# Patient Record
(demographics unavailable — no encounter records)

---

## 2025-05-01 NOTE — HISTORY OF PRESENT ILLNESS
[de-identified] : Patient is a 59 y/o F who presents a chief complaint of left breast calcs.  Diag L MMG/US 3/13/25 ([facility]) - Rec stereotactic biopsy of the indeterminate grouping of developing left breast calcifications (BIRADS 4)  Screening MMG/US 1/23/25 - additional imaging BIRADS 0.  Personal history of prior benign bilateral breast surgical biopsies.  No FH breast/ovarian cancer

## 2025-05-01 NOTE — ASSESSMENT
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

## 2025-05-01 NOTE — PHYSICAL EXAM
[Normal] : supple, no neck mass and thyroid not enlarged [Normal Neck Lymph Nodes] : normal neck lymph nodes  [Normal Supraclavicular Lymph Nodes] : normal supraclavicular lymph nodes [Normal Groin Lymph Nodes] : normal groin lymph nodes [Normal Axillary Lymph Nodes] : normal axillary lymph nodes [Normal] : oriented to person, place and time, with appropriate affect [de-identified] : no masses/adenopathy bilat, bilateral keloids from prior surgery

## 2025-05-01 NOTE — PHYSICAL EXAM
[Normal] : supple, no neck mass and thyroid not enlarged [Normal Neck Lymph Nodes] : normal neck lymph nodes  [Normal Supraclavicular Lymph Nodes] : normal supraclavicular lymph nodes [Normal Groin Lymph Nodes] : normal groin lymph nodes [Normal Axillary Lymph Nodes] : normal axillary lymph nodes [Normal] : oriented to person, place and time, with appropriate affect [de-identified] : no masses/adenopathy bilat, bilateral keloids from prior surgery

## 2025-05-01 NOTE — ASSESSMENT
[FreeTextEntry1] : Left 12:00 calcifications - BIRADS 4 Left stereotactic biopsy performed - see procedure note Check pathology Left mammogram 6 mos if benign   enclosed pathology report   5/1/25 addendum:  Path - DCIS with microinvasion, ER pos/AR neg, Her 2 2+ - CISH pending I had a long discussion with the pt regarding her dx, prognosis and all mgmt options Recommend breast MRI followed by lumpectomy if the MRI doesn't show any additional disease The pt agrees Breast MRI ordered Plan surgery at [facility] All questions answered

## 2025-05-01 NOTE — HISTORY OF PRESENT ILLNESS
[de-identified] : Patient is a 61 y/o F who presents a chief complaint of left breast calcs.  Diag L MMG/US 3/13/25 ([facility]) - Rec stereotactic biopsy of the indeterminate grouping of developing left breast calcifications (BIRADS 4)  Screening MMG/US 1/23/25 - additional imaging BIRADS 0.  Personal history of prior benign bilateral breast surgical biopsies.  No FH breast/ovarian cancer

## 2025-05-01 NOTE — PHYSICAL EXAM
[Normal] : supple, no neck mass and thyroid not enlarged [Normal Neck Lymph Nodes] : normal neck lymph nodes  [Normal Supraclavicular Lymph Nodes] : normal supraclavicular lymph nodes [Normal Groin Lymph Nodes] : normal groin lymph nodes [Normal Axillary Lymph Nodes] : normal axillary lymph nodes [Normal] : oriented to person, place and time, with appropriate affect [de-identified] : no masses/adenopathy bilat, bilateral keloids from prior surgery

## 2025-05-01 NOTE — ASSESSMENT
[FreeTextEntry1] : Left 12:00 calcifications - BIRADS 4 Left stereotactic biopsy performed - see procedure note Check pathology Left mammogram 6 mos if benign   enclosed pathology report   5/1/25 addendum:  Path - DCIS with microinvasion, ER pos/[place] neg, Her 2 2+ - CISH pending I had a long discussion with the pt regarding her dx, prognosis and all mgmt options Recommend breast MRI followed by lumpectomy if the MRI doesn't show any additional disease The pt agrees Breast MRI ordered Plan surgery at [facility] All questions answered

## 2025-05-01 NOTE — CONSULT LETTER
[Dear  ___] : Dear  [unfilled], [Consult Letter:] : I had the pleasure of evaluating your patient, [unfilled]. [Please see my note below.] : Please see my note below. [Sincerely,] : Sincerely, [FreeTextEntry3] : Alvarado Jaime MD FACS

## 2025-06-18 NOTE — PHYSICAL EXAM
[Normal] : supple, no neck mass and thyroid not enlarged [Normal Neck Lymph Nodes] : normal neck lymph nodes  [Normal Supraclavicular Lymph Nodes] : normal supraclavicular lymph nodes [Normal Groin Lymph Nodes] : normal groin lymph nodes [Normal Axillary Lymph Nodes] : normal axillary lymph nodes [Normal] : oriented to person, place and time, with appropriate affect [de-identified] : no masses/adenopathy bilat, bilateral keloids from prior surgery

## 2025-06-18 NOTE — HISTORY OF PRESENT ILLNESS
[de-identified] : Patient is a 59 y/o F who presents a follow up.   path 5/23/25- Breast, right, slightly lower central, core biopsy: - Lobular carcinoma in situ (LCIS), classic type - Cysts - Columnar cell change - Usual ductal hyperplasia - Calcifications identified in benign epithelium  Breast MRI 5/09/25 - Biopsy-proven malignant nonmass enhancement/postbiopsy changes in the central LEFT breast measuring up to 2.6 extending to the nipple with associated nipple retraction and skin thickening. Continued surgical/oncologic management. Slightly lower central right breast 2.1 cm indeterminant focal nonmass enhancement ([id number]). MRI guided core needle biopsy is recommended. Nonspecific dermal areas of enhancement overlying the medial right breast measuring up to 1.6 cm and 1.2 cm (axial image 88). Correlate with physical exam. No lymphadenopathy. (BIRADS 4A)  5/1/25 addendum:  Path - DCIS with microinvasion, ER pos/AR neg, Her 2 2+ - CISH pending  Diag L MMG/US 3/13/25 ([facility]) - Rec stereotactic biopsy of the indeterminate grouping of developing left breast calcifications (BIRADS 4)  Screening MMG/US 1/23/25 - additional imaging BIRADS 0.  Personal history of prior benign bilateral breast surgical biopsies.  No FH breast/ovarian cancer
n/a

## 2025-07-30 NOTE — PHYSICAL EXAM
[Normal] : supple, no neck mass and thyroid not enlarged [Normal Neck Lymph Nodes] : normal neck lymph nodes  [Normal Supraclavicular Lymph Nodes] : normal supraclavicular lymph nodes [Normal Groin Lymph Nodes] : normal groin lymph nodes [Normal Axillary Lymph Nodes] : normal axillary lymph nodes [Normal] : oriented to person, place and time, with appropriate affect [de-identified] : no masses/adenopathy bilat, bilateral keloids from prior surgery

## 2025-07-30 NOTE — HISTORY OF PRESENT ILLNESS
[de-identified] : Patient is a 61 y/o F who presents a post op.   Final path 7/08/25- 1.  Breast, left 12:00, lumpectomy -   No invasive carcinoma or lymphovascular invasion identified. -   Ductal carcinoma in situ, nuclear grade 2-3, cribriform, micropapillary and solid types with necrosis and calcifications.  The DCIS spans a distance of approximately 23.0 mm in greatest dimension and is present in 5 of 15 slides. -   The resection margins are negative for carcinoma.  DCIS is 2.0 mm from the nearest margin (superior). -   Skin negative for carcinoma. -   Fibrocystic changes. -   Biopsy site changes. 2.  Breast, left, superior margin, biopsy -   Invasive ductal carcinoma present in 2 of 4 slides, Horace grade 2 (tubule formation: 3, nuclear pleomorphism: 2, mitotic rate: 1; total score 6/9). -   The invasive tumor measures 3.0 mm in greatest dimension. -   DCIS present in 4 of 4 slides.  The largest focus of DCIS measures approximately 6.0 mm in greatest dimension. -   DCIS is 0.1 to 0.2 mm from the designated margin.  The invasive tumor is 1.0 mm from the designated margin. -   Focal biopsy site changes. 3.  Breast, left, medial margin, biopsy -   Benign breast tissue. -   Inked margin examined. 4.  Breast, left, inferior margin, biopsy -   Benign breast and fibroadipose tissue. -   Inked margin examined. 5.  Breast, left, lateral margin, biopsy -   Benign breast tissue. -   Inked margin examined. 6.  Breast, left, deep margin, biopsy -   Benign skeletal muscle and fibroadipose tissue. 7.  Breast, left, anterior margin, biopsy -   Benign fibroadipose tissue. ESTROGEN RECEPTOR (ER): POSITIVE, >95% NUCLEAR STAINING WITH STRONG INTENSITY PROGESTERONE RECEPTOR (NE): POSITIVE, <5% % NUCLEAR STAINING WITH WEAK INTENSITY HER-2: NEGATIVE, SCORE 0    path 5/23/25- Breast, right, slightly lower central, core biopsy: - Lobular carcinoma in situ (LCIS), classic type - Cysts - Columnar cell change - Usual ductal hyperplasia - Calcifications identified in benign epithelium  Breast MRI 5/09/25 - Biopsy-proven malignant nonmass enhancement/postbiopsy changes in the central LEFT breast measuring up to 2.6 extending to the nipple with associated nipple retraction and skin thickening. Continued surgical/oncologic management. Slightly lower central right breast 2.1 cm indeterminant focal nonmass enhancement ([id number]). MRI guided core needle biopsy is recommended. Nonspecific dermal areas of enhancement overlying the medial right breast measuring up to 1.6 cm and 1.2 cm (axial image 88). Correlate with physical exam. No lymphadenopathy. (BIRADS 4A)  5/1/25 addendum:  Path - DCIS with microinvasion, ER pos/NE neg, Her 2 2+ - CISH pending  Diag L MMG/US 3/13/25 ([facility]) - Rec stereotactic biopsy of the indeterminate grouping of developing left breast calcifications (BIRADS 4)  Screening MMG/US 1/23/25 - additional imaging BIRADS 0.  Personal history of prior benign bilateral breast surgical biopsies.  No FH breast/ovarian cancer.